# Patient Record
Sex: FEMALE | Race: WHITE
[De-identification: names, ages, dates, MRNs, and addresses within clinical notes are randomized per-mention and may not be internally consistent; named-entity substitution may affect disease eponyms.]

---

## 2018-01-11 ENCOUNTER — HOSPITAL ENCOUNTER (EMERGENCY)
Dept: HOSPITAL 14 - H.EROB2 | Age: 33
Discharge: HOME | End: 2018-01-11
Payer: COMMERCIAL

## 2018-01-11 VITALS — BODY MASS INDEX: 24.7 KG/M2

## 2018-01-11 DIAGNOSIS — K92.1: ICD-10-CM

## 2018-01-11 DIAGNOSIS — Z3A.20: ICD-10-CM

## 2018-01-11 DIAGNOSIS — O26.92: Primary | ICD-10-CM

## 2018-01-11 NOTE — OBHP
===========================

Datetime: 2018 22:42

===========================

   

IP Adm Impression:  , intrauterine pregnancy; No Active Labor

IP Admit Plan:  Observation/Evaluation; Discharge home

Admit Comment, IP Provider:  43-year-old  0-0 at 20 weeks and 1 day gestational age presents to 
the ED complaining of slight amount of blood in stool. Patient denies any bleeding at this time. Magalie
ent denies any vaginal bleeding, fluids, contractions or cramping.

   Past medical history none

   Past surgical history none

   Medications prenatal vitamins, ASA

   No known drug allergies

   Obstetrical history first trimester loss of pregnancy 2

   Social history no tobacco, drugs, no alcohol

      

   Physical exam: Deferred physical exam findings

      

   Assessment:

   22-year-old  0-0 at 20 weeks gestational age with one episode of a small amount of blood in b
owel movement. Pelvic exam normal and no obstetrical issues at this time. Maternal well-being and fet
al well-being reassuring at this time.

      

   Plan:

   Patient discharged home

   Discussed plan the patient and all patient questions answered

   Patient will follow up with PMD this week.

Abdomen - PN:  Normal

Back - PN:  Normal

Contraction Comments Provider:  none

Comments, ACOG Physical Exam:  Sterile speculum exam:

   Cervix long, closed, posterior

   No blood, fluid, discharge

EGA AdmitDate IP:  20.1

Vital Signs Provider:  Reviewed; Within Normal Limits

IP Chief Complaint:  Other

Dilatation, Provider:  0

Effacement, Provider:  0

Station, Provider:  -4

Genitourinary Exam:  Normal

## 2018-01-12 VITALS — HEART RATE: 72 BPM | DIASTOLIC BLOOD PRESSURE: 83 MMHG | SYSTOLIC BLOOD PRESSURE: 98 MMHG
